# Patient Record
Sex: FEMALE | URBAN - METROPOLITAN AREA
[De-identification: names, ages, dates, MRNs, and addresses within clinical notes are randomized per-mention and may not be internally consistent; named-entity substitution may affect disease eponyms.]

---

## 2017-11-29 PROBLEM — M54.2 CERVICALGIA: Status: ACTIVE | Noted: 2017-11-29

## 2020-02-27 ENCOUNTER — APPOINTMENT (RX ONLY)
Dept: URBAN - METROPOLITAN AREA CLINIC 38 | Facility: CLINIC | Age: 75
Setting detail: DERMATOLOGY
End: 2020-02-27

## 2020-02-27 DIAGNOSIS — L81.4 OTHER MELANIN HYPERPIGMENTATION: ICD-10-CM

## 2020-02-27 DIAGNOSIS — L72.0 EPIDERMAL CYST: ICD-10-CM

## 2020-02-27 DIAGNOSIS — Z71.89 OTHER SPECIFIED COUNSELING: ICD-10-CM

## 2020-02-27 DIAGNOSIS — D22 MELANOCYTIC NEVI: ICD-10-CM

## 2020-02-27 DIAGNOSIS — L82.0 INFLAMED SEBORRHEIC KERATOSIS: ICD-10-CM

## 2020-02-27 DIAGNOSIS — L30.0 NUMMULAR DERMATITIS: ICD-10-CM

## 2020-02-27 DIAGNOSIS — L82.1 OTHER SEBORRHEIC KERATOSIS: ICD-10-CM

## 2020-02-27 DIAGNOSIS — D18.0 HEMANGIOMA: ICD-10-CM

## 2020-02-27 PROBLEM — D18.01 HEMANGIOMA OF SKIN AND SUBCUTANEOUS TISSUE: Status: ACTIVE | Noted: 2020-02-27

## 2020-02-27 PROBLEM — D22.9 MELANOCYTIC NEVI, UNSPECIFIED: Status: ACTIVE | Noted: 2020-02-27

## 2020-02-27 PROCEDURE — ? COUNSELING

## 2020-02-27 PROCEDURE — ? PRESCRIPTION

## 2020-02-27 PROCEDURE — ? LIQUID NITROGEN

## 2020-02-27 PROCEDURE — 99203 OFFICE O/P NEW LOW 30 MIN: CPT | Mod: 25

## 2020-02-27 PROCEDURE — 17110 DESTRUCTION B9 LES UP TO 14: CPT | Mod: 52

## 2020-02-27 RX ORDER — TRIAMCINOLONE ACETONIDE 1 MG/G
CREAM TOPICAL BID
Qty: 1 | Refills: 3 | Status: CANCELLED

## 2020-02-27 ASSESSMENT — LOCATION SIMPLE DESCRIPTION DERM
LOCATION SIMPLE: LEFT LIP
LOCATION SIMPLE: RIGHT POSTERIOR THIGH
LOCATION SIMPLE: ABDOMEN

## 2020-02-27 ASSESSMENT — LOCATION ZONE DERM
LOCATION ZONE: LEG
LOCATION ZONE: LIP
LOCATION ZONE: TRUNK

## 2020-02-27 ASSESSMENT — LOCATION DETAILED DESCRIPTION DERM
LOCATION DETAILED: LEFT LOWER CUTANEOUS LIP
LOCATION DETAILED: RIGHT PROXIMAL LATERAL POSTERIOR THIGH
LOCATION DETAILED: PERIUMBILICAL SKIN
LOCATION DETAILED: RIGHT PROXIMAL POSTERIOR THIGH

## 2020-02-27 NOTE — PROCEDURE: LIQUID NITROGEN

## 2023-09-28 ENCOUNTER — OFFICE VISIT (OUTPATIENT)
Dept: PHYSICAL MEDICINE AND REHAB | Facility: MEDICAL CENTER | Age: 78
End: 2023-09-28
Payer: MEDICARE

## 2023-09-28 VITALS
BODY MASS INDEX: 37.45 KG/M2 | SYSTOLIC BLOOD PRESSURE: 128 MMHG | DIASTOLIC BLOOD PRESSURE: 72 MMHG | WEIGHT: 203.48 LBS | TEMPERATURE: 97.7 F | OXYGEN SATURATION: 96 % | HEIGHT: 62 IN | HEART RATE: 80 BPM

## 2023-09-28 DIAGNOSIS — M20.41 HAMMER TOE OF RIGHT FOOT: ICD-10-CM

## 2023-09-28 DIAGNOSIS — M19.072 OSTEOARTHRITIS OF JOINT OF TOE OF LEFT FOOT: ICD-10-CM

## 2023-09-28 DIAGNOSIS — R20.0 NUMBNESS AND TINGLING OF BOTH FEET: ICD-10-CM

## 2023-09-28 DIAGNOSIS — Z71.3 DIETARY COUNSELING: ICD-10-CM

## 2023-09-28 DIAGNOSIS — M51.36 DEGENERATIVE LUMBAR DISC: ICD-10-CM

## 2023-09-28 DIAGNOSIS — Z91.81 RISK FOR FALLS: ICD-10-CM

## 2023-09-28 DIAGNOSIS — Z71.82 EXERCISE COUNSELING: ICD-10-CM

## 2023-09-28 DIAGNOSIS — R73.03 PREDIABETES: ICD-10-CM

## 2023-09-28 DIAGNOSIS — M47.816 LUMBAR FACET ARTHROPATHY: ICD-10-CM

## 2023-09-28 DIAGNOSIS — M43.16 SPONDYLOLISTHESIS OF LUMBAR REGION: ICD-10-CM

## 2023-09-28 DIAGNOSIS — R20.2 NUMBNESS AND TINGLING OF BOTH FEET: ICD-10-CM

## 2023-09-28 DIAGNOSIS — M19.071 OSTEOARTHRITIS OF JOINT OF TOE OF RIGHT FOOT: ICD-10-CM

## 2023-09-28 DIAGNOSIS — E66.9 OBESITY (BMI 30-39.9): ICD-10-CM

## 2023-09-28 PROCEDURE — 3078F DIAST BP <80 MM HG: CPT | Performed by: GENERAL PRACTICE

## 2023-09-28 PROCEDURE — 99204 OFFICE O/P NEW MOD 45 MIN: CPT | Performed by: GENERAL PRACTICE

## 2023-09-28 PROCEDURE — 1125F AMNT PAIN NOTED PAIN PRSNT: CPT | Performed by: GENERAL PRACTICE

## 2023-09-28 PROCEDURE — 3074F SYST BP LT 130 MM HG: CPT | Performed by: GENERAL PRACTICE

## 2023-09-28 PROCEDURE — 1170F FXNL STATUS ASSESSED: CPT | Performed by: GENERAL PRACTICE

## 2023-09-28 RX ORDER — GABAPENTIN 100 MG/1
100-300 CAPSULE ORAL NIGHTLY
Qty: 90 CAPSULE | Refills: 3 | Status: SHIPPED | OUTPATIENT
Start: 2023-09-28 | End: 2024-01-26

## 2023-09-28 ASSESSMENT — PATIENT HEALTH QUESTIONNAIRE - PHQ9: CLINICAL INTERPRETATION OF PHQ2 SCORE: 0

## 2023-09-28 ASSESSMENT — PAIN SCALES - GENERAL: PAINLEVEL: 5=MODERATE PAIN

## 2023-09-28 ASSESSMENT — FIBROSIS 4 INDEX: FIB4 SCORE: 2.64

## 2023-09-28 NOTE — PROGRESS NOTES
Physiatry (Physical Medicine and  Rehabilitation)     Date of service: See epic    Chief complaint:   Chief Complaint   Patient presents with    Establish Care     Chronic Midline LBP w/out Sciatica        Referring provider: Kimberly Collins M.D.    HISTORY    HPI:  Marylou Candelario is a 78 y.o. year old pleasant female with past medical history of vitamin D deficiency, hepatic dysfunction, left knee meniscus 1996, disc decompression level 1 and 2 thermal disco plasty 2003, lower back stenosis and back fusion with bone graft 2004, left and right shoulder rotator pathology, who presents with a referral for Chronic midline low back pain without sciatica      Medical records review:  I reviewed the note from the referring provider Kimberly Collins M.D. including the note dated 6/15/2023.    Podiatry 9/12/2023 Branden Card: Patient is here for evaluation of right foot pain. She describes pain of the right medial forefoot area as well as the right 2nd toe. She is not diabetic but does relate significant numbness in her feet. She does have a history of back surgery and peripheral neuropathy.    Prior Procedures:   Spinal surgery with fusion  Stem cell injection  in [place] 2018 2003 disc impression level 1 and 2 thermal discal plasty  2000 for lower back stenosis and back fusion with bone graft      Patient presented for chronic low back pain.  History of disc decompression and back fusion in 2003 and 2004.  Described pain over right SI joint.  No radiation of pain.  Currently using ibuprofen 200 mg and Tylenol as needed.  Says she lives by herself she is avoiding any sedate of medications.  She stated that she has completed her year of physical therapy aquatic-based due to intolerance of land therapy.  She is eligible for physical therapy prescription in January.  In addition she does use a cane and walker for ambulation but prefers the cane.  Her pain is aggravated with walking, bending forward and backward, side  bending, walking up and down stairs, and laying down.  Though she said sometimes laying down can help her out.  In addition she has been seen by podiatry for burning tingling sensation of her feet.  Stated currently she is only being first tried a topical over-the-counter medication and the podiatrist is recommending a specific shoe that insurance is unwilling to pay for.    ROS:   Red Flags ROS:   Fever, Chills, Sweats: Denies  Involuntary Weight Loss: Denies  Bladder Incontinence: Denies  Bowel Incontinence: denies  Saddle Anesthesia: Denies    FUNCTIONAL history: Stated that she does not drive, issues performing ADLs,?cane and walker,?lives?alone in [facility]   OCCUPATIONAL history: Retired     GOALS OF TREATMENT: Pain relief. improve function.        PMHx:   Past Medical History:   Diagnosis Date    Anxiety     CATARACT     bilateral IOLI    Depression     Heart burn     Hypertension     controlled w/meds    IBD (inflammatory bowel disease)     Muscle disorder     Nerve damage     LT 2ND & 3RD TOE FROM BACK SURG.    Pain     back (stenosis) and neck pain, lt shoulder pain    Sleep apnea     mild--doesn't like CPAP    Snoring        PSHx:   Past Surgical History:   Procedure Laterality Date    SHOULDER ARTHROSCOPY W/ ROTATOR CUFF REPAIR Right 07/20/2017    Procedure: RT SHOULDER ARTHROSCOPY W/ ROTATOR CUFF REPAIR, RTHROTOMY, ARTH SUBACROMIAL DECOM, DIST CLAV EXCISION,  BICEPS TENOTOMY;  Surgeon: Juan M Gonsalves M.D.;  Location: [facility];  Service:     OTHER  07/05/2016    Oral     MAMMOPLASTY REDUCTION  12/12/2013    Performed by Anil Buchanan M.D. at [facility]    SHOULDER ARTHROSCOPY W/ ROTATOR CUFF REPAIR  05/08/2013    Performed by Juan M [facility]alves M.D[facility] at [facility]    HAMMERTOE CORRECTION  05/08/2013    Performed by Juan M [facility]onsalves M.D. at [facility]    OTHER  01/01/2013    bilateral cataract surgery    [facility]    ABDOMINAL  HYSTERECTOMY TOTAL      APPENDECTOMY      EYE SURGERY Bilateral     GYN SURGERY      hysto    LAMINOTOMY      LUMPECTOMY      OTHER      bilat ear cosmetic surg.    OTHER      hemorrhoidectomy    OTHER ABDOMINAL SURGERY      appy, brinda    OTHER ORTHOPEDIC SURGERY      RT CTR, LT KNEE SCOPE,MENISCUS REPAIR    OTHER ORTHOPEDIC SURGERY      SPINAL SURGERY x3 W/FUSION       Family history   Family History   Problem Relation Age of Onset    Cancer Mother     Cancer Father     Heart Disease Brother        Medications: reviewed on epic.   Outpatient Medications Marked as Taking for the 9/28/23 encounter (Office Visit) with Deangelo Marks D.O.   Medication Sig Dispense Refill    gabapentin (NEURONTIN) 100 MG Cap Take 1-3 Capsules by mouth every evening for 120 days. 90 Capsule 3    potassium chloride SA (KDUR) 20 MEQ Tab CR TAKE 1 TABLET BY MOUTH TWICE A  Tablet 3    losartan (COZAAR) 25 MG Tab TAKE 1 TABLET BY MOUTH EVERY DAY 90 Tablet 3    vitamin D (CHOLECALCIFEROL) 1000 UNIT Tab Take 2,000 Units by mouth every day.      Multiple Vitamins-Minerals (CENTRUM SILVER PO) Take  by mouth.      Omega-3 Fatty Acids (FISH OIL) 1000 MG Cap capsule Take 1,000 mg by mouth 3 times a day, with meals.      Glucosamine-Chondroitin (GLUCOSAMINE CHONDR COMPLEX PO) Take  by mouth.          Allergies:   Allergies   Allergen Reactions    Tape      Does not tolerate cloth --paper tape okay.       Social Hx:   Social History     Socioeconomic History    Marital status:      Spouse name: Not on file    Number of children: Not on file    Years of education: Not on file    Highest education level: Not on file   Occupational History    Not on file   Tobacco Use    Smoking status: Never    Smokeless tobacco: Never   Substance and Sexual Activity    Alcohol use: No    Drug use: No    Sexual activity: Not Currently   Other Topics Concern     Service No    Blood Transfusions No    Caffeine Concern No    Occupational Exposure No  "   Hobby Hazards No    Sleep Concern Yes    Stress Concern No    Weight Concern No    Special Diet No    Back Care No    Exercise No    Bike Helmet Yes    Seat Belt Yes    Self-Exams Yes   Social History Narrative    Not on file     Social Determinants of Health     Financial Resource Strain: Unknown (6/13/2023)    Overall Financial Resource Strain (CARDIA)     Difficulty of Paying Living Expenses: Patient refused   Food Insecurity: Unknown (6/13/2023)    Hunger Vital Sign     Worried About Running Out of Food in the Last Year: Patient refused     Ran Out of Food in the Last Year: Patient refused   Transportation Needs: Unknown (6/13/2023)    PRAPARE - Transportation     Lack of Transportation (Medical): Patient refused     Lack of Transportation (Non-Medical): Patient refused   Physical Activity: Unknown (6/13/2023)    Exercise Vital Sign     Days of Exercise per Week: Patient refused     Minutes of Exercise per Session: Patient refused   Stress: Unknown (6/13/2023)    [employer] - Occupational Stress Questionnaire     Feeling of Stress : Patient refused   Social Connections: Unknown (6/13/2023)    Social Connection and Isolation Panel [NHANES]     Frequency of Communication with Friends and Family: Patient refused     Frequency of Social Gatherings with Friends and Family: Patient refused     Attends Spiritism Services: Patient refused     Active Member of Clubs or Organizations: No     Attends Club or Organization Meetings: Patient refused     Marital Status:    Intimate Partner Violence: Not on file   Housing Stability: Not on file         EXAMINATION   Vitals: /72 (BP Location: Left arm, Patient Position: Sitting, BP Cuff Size: Large adult)   Pulse 80   Temp 36.5 °C (97.7 °F) (Temporal)   Ht 1.575 m (5' 2\")   Wt 92.3 kg (203 lb 7.8 oz)   SpO2 96%   Physical Exam:     Body Habitus: Body mass index is 37.22 kg/m².  Appearance: Well-groomed, well-nourished, not " disheveled  Eyes: No scleral icterus to suggest severe liver disease, no proptosis to suggest severe hyperthyroid  ENT -no obvious auditory deficits, no external lesions, moist mucus membranes   Skin -no rashes or lesions noted   Respiratory-  breathing comfortably on room air, no audible wheezing, full sentences  Cardiovascular- No lower extremity edema noted.   Psychiatric- alert and oriented,  calm, comfortable, cooperative     Musculoskeletal and Neuro:  Gait and station - normal gait with reciprocal pattern,  no use of ambulatory device, no arm assistance with sit-to-stand, nonantalgic. no loss of balance.  No change in patient's demeanor with exam. Able to toe/heel/tandem walk without difficulty, and No pes planus, no pes cavus.   Grossly normal cranial nerve exam and sensory exam grossly  Coordination grossly intact  Single leg balance / Trendelenburg:  severely limited in balance and control positive bilaterally    Thoracic/Lumbar Spine/Sacral Spine/Hips   Inspection: No evidence of atrophy in bilateral lower extremities throughout     ROM: full  active range of motion with flexion, lateral flexion, and rotation bilaterally.   There is decreased active range of motion with lumbar extension with pain.    There is pain with facet loading maneuver (extension rotation) with axial low back pain on the RIGHT side(s)    Palpation:   No tenderness to palpation in midline at T1-T12 levels. No tenderness to palpation in the left and right of the midline T1-L5  palpation over SI joint: positive right, negative left  palpation in hip or over the gluteus medius tendon insertion: positive bilaterally     Lumbar spine Special tests  Neuro tension  Straight leg test equivocal pain to tightness bilateral hamstrings  Slump test equivocal pain to tightness bilateral hamstrings    HIP  FAIR test positive right, negative left   Fátima equivocal pain to right greater trochanter    SI joint tests  Observation patient sits on one  "buttocks: Negative  SI joint compression equivocal pain to greater trochanters     Thigh thrust test equivocal pain to right greater trochanter      Key points for the international standards for neurological classification of spinal cord injury (ISNCSCI) to light touch.   Dermatome R L   L2 2 2   L3 2 2   L4 2 2   L5 2 2   S1 2 2     Motor Exam Lower Extremities  ? Myotome R L   Hip flexion L2 4+ pain 5   Knee extension L3 5 5   Ankle dorsiflexion L4 5 5   Toe extension L5 5 5   Ankle plantarflexion S1 5 5     Reflexes  Clonus of the ankle negative bilaterally     Bilateral feet: Pes planus, hammertoes, no obvious open lesions.      MEDICAL DECISION MAKING    Medical records review: see under HPI section.     DATA    Labs:   Lab Results   Component Value Date/Time    SODIUM 141 05/29/2023 09:24 AM    POTASSIUM 4.4 05/29/2023 09:24 AM    CHLORIDE 109 (H) 05/29/2023 09:24 AM    CO2 22 05/29/2023 09:24 AM    ANION 10 05/29/2023 09:24 AM    GLUCOSE 99 05/29/2023 09:24 AM    BUN 15 05/29/2023 09:24 AM    CREATININE 0.6 05/29/2023 09:24 AM    CALCIUM 9.5 05/29/2023 09:24 AM    ASTSGOT 49 (H) 05/29/2023 09:24 AM    ALTSGPT 46 05/29/2023 09:24 AM    TBILIRUBIN 0.7 05/29/2023 09:24 AM    ALBUMIN 4.1 05/29/2023 09:24 AM    TOTPROTEIN 7.1 05/29/2023 09:24 AM    AGRATIO 1.4 05/29/2023 09:24 AM   ]    No results found for: \"PROTHROMBTM\", \"INR\"     Lab Results   Component Value Date/Time    WBC 7.0 09/06/2022 02:18 PM    RBC 5.70 (H) 09/06/2022 02:18 PM    HEMOGLOBIN 17.9 (H) 09/06/2022 02:18 PM    HEMATOCRIT 52.5 (H) 09/06/2022 02:18 PM    MCV 92.1 09/06/2022 02:18 PM    MCH 31.4 09/06/2022 02:18 PM    MCHC 34.1 09/06/2022 02:18 PM    MPV 11.1 (H) 09/06/2022 02:18 PM    NEUTSPOLYS 83.6 (H) 12/13/2013 04:40 AM    LYMPHOCYTES 10.4 (L) 12/13/2013 04:40 AM    MONOCYTES 5.9 12/13/2013 04:40 AM    EOSINOPHILS 0.0 12/13/2013 04:40 AM    BASOPHILS 0.1 12/13/2013 04:40 AM        Lab Results   Component Value Date/Time    HBA1C 5.7 (H) " 05/29/2023 09:24 AM        Imaging:   I personally reviewed following images, these are my reads  June 2019 lumbar x-ray: Mild lumbar dextroscoliosis suspect acquired, facet arthropathy lower levels L1 down to S1.  Anterolisthesis L2 on L3 and L4 and L5.    Left foot x-ray 8/29/2022: First MTP osteoarthritis, second proximal phalange E deformity appears chronic.  Pes planus, calcifications of noted in plantar tendon.  Calcaneal spur.    Right foot x-ray 8/29/2022: Worst right first MTP osteoarthritis, narrowing of proximal phalanges specially second.  Pes planus.  Calcaneal spur    IMAGING radiology reads. I reviewed the following radiology reads                                                             Results for orders placed during the hospital encounter of 12/26/15    DX-CERVICAL SPINE-4+ VIEWS    Impression  Degenerative disc disease noted at C4-C6.    Otherwise unremarkable cervical spine    Reji Pearce MD  12/26/2015 2:06 PM           Results for orders placed in visit on 08/29/22      HISTORY/REASON FOR EXAM:  Atraumatic Pain/Swelling/Deformity.           TECHNIQUE/EXAM DESCRIPTION AND NUMBER OF VIEWS: LEFT foot, 3 views, 8/29/2022 1:00 PM.     COMPARISON: None.     FINDINGS:  There is severe osteoarthritis of the first metatarsal phalangeal joint characterized by joint space narrowing, periarticular sclerosis and marginal osteophyte formation.  There is associated mild hallux valgus deformity.     Similar, but less substantial degenerative changes are noted involving second metatarsal phalangeal joint.     No fracture or destructive osseous process is noted.     There are calcifications involving the plantar fascia, raising the question of sequela of previous injury, or inflammation.     IMPRESSION:     Degenerative changes are present, but no fracture is noted.     This exam was performed in an orthopedic clinic of the [facility]. This interpretation is performed in addition to the  interpretation by the ordering provider    DX-LUMBAR SPINE-2 OR 3 VIEWS    HISTORY/REASON FOR EXAM: .  status post surgery 2004; standing if possible at least 3 views        TECHNIQUE/EXAM DESCRIPTION AND NUMBER OF VIEWS:  Lumbar spine, 3 views, 3/1/2019 3:05 PM.     COMPARISON: 7/10/2018     FINDINGS:  Bones are osteopenic. No definite fracture seen.     Alignment: At L2-3 there is listhesis measuring 6 mm. At L4-5 there is listhesis measuring 5 mm. Overall these findings are unchanged.     Degenerative Disk Disease: There are mild degenerative disc changes present throughout.     There are postoperative changes of a prior posterior decompression there is posterior lateral bone graft in place.     There are surgical clips likely from prior cholecystectomy.     There are vascular calcifications.     IMPRESSION:     1.  Degenerative changes as above.  2.  Listhesis at L2-3 and L4-5 is unchanged.    DX-SHOULDER 2+ RIGHT    Impression  Mild acromioclavicular and glenohumeral degenerative changes.    Matt Zhou DO  12/5/2015 5:43 PM    Results for orders placed during the hospital encounter of 07/10/18    DX-THORACIC SPINE-2 VIEWS    Impression  No fracture seen.    Call report made at approximately 11:00 AM to ordering provider            Diagnosis   Visit Diagnoses     ICD-10-CM   1. Lumbar facet arthropathy  M47.816   2. Spondylolisthesis of lumbar region  M43.16   3. Degenerative lumbar disc  M51.36   4. Osteoarthritis of joint of toe of left foot  M19.072   5. Osteoarthritis of joint of toe of right foot  M19.071   6. Hammer toe of right foot  M20.41   7. Obesity (BMI 30-39.9)  E66.9   8. Prediabetes  R73.03   9. Dietary counseling  Z71.3   10. Exercise counseling  Z71.82   11. Risk for falls  Z91.81       ASSESSMENT AND PLAN:  Marylou Candelario 78 y.o. female  Patient with historical and clinical evidence consistent with chronic low back pain.  Prior imaging in 2019 revealed lumbar facet arthropathy,  spondylolisthesis, degenerative lumbar disc.  today, suspected that her pain is likely from her right lower lumbar facets as she had pain in the region, prior imaging with lumbar facet arthropathy, and facet loading.  However, considering right SI joint dysfunction with positive Darwin finger.  Considering lumbar radiculopathy and will need imaging as weak right hip flexion.  At this point, will need updated imaging and will prescribe gabapentin.  In addition, patient has numbness and tingling of both feet with known osteoarthritis of feet and pes planus and currently involved with podiatry.  Has follow-up.  Extensive discussion regarding treatment options, at this time recommending the following:    Orders Placed This Encounter    DX-LUMBAR SPINE-2 OR 3 VIEWS    Referral to Physical Therapy    gabapentin (NEURONTIN) 100 MG Cap         PLAN  I did have an extensive discussion regarding pathology and treatment options with the patient today including medications, injections, physical agents (eg. water, heat, cold, TENS), therapy-based programs (eg. massage, stretching/traction, exercise), alternative modalities, and lastly surgical intervention:  -Limitations:   activity limitations recommended at this point in time of no heavy lifting, always using a walker instead of a cane, no axial loading  -please wear indoor closed toe shoes  -work on single leg balance  -remove rugs and keep home well lit  -use stationary bike  -always ambulate with cane or walker  -Therapy (PT/OT/Aquatherapy): Aquatic therapy ordered to focus on strengthening and stretching  -Home exercise program: encouraged and demonstrated home exercises of regular strengthening and stretching and stationary bicycle  -Office Injections: not indicated at this time  -Medications/Modalities:   -Uptitrate the gabapentin as follows:  Day 1-7: 100mg at bedtime every night  Day 8-14 (100mg two tabs): 200mg at bedtime every night  Day 15 and afterwards (100mg 3  tabs): 200mg at bedtime every night   -Tylenol/acetaminophen 975mg (for example, 3 tabs of 325mg, or 2 tabs of 500mg) every 6-8 hours as needed.  Do not take more than 3000mg of Tylenol in 24 hours.   -Ibuprofen 200mg (1-3 tabs of 600mg-800mg) every 6-8 hours (take with food) as needed.  Do not take more than 2400 mg of ibuprofen in 24 hours. Take with milk or with food.  do not take with other NSAIDs such as naproxen, ibuprofen, Celebrex, meloxicam.  -You may also try ice packs or heating pads to help with pain for no more than 15 minutes at a time  -Brace/orthotic/assistive devices: Advised patient to use walker instead of cane  Diagnostic workup: Reviewed 2019 with concerning findings but will need updated imaging and if concerning pathology likely will need MRI  Interventional program: Depending on results but patient may benefit as she would like to avoid oral medications  Referrals: Aqua therapy       Follow-up:  Return to clinic in 12 weeks for follow-up of symptomatology, or sooner as needed for any acute issues.  Patient expressed understanding of the management plan. Patient (and Family Members) was/were encouraged to call if any worries, issues, problems or concerns prior to the next visit     Please note that this dictation was created using voice recognition software. I have made every reasonable attempt to correct obvious errors but there may be errors of grammar and content that I may have overlooked prior to finalization of this note.      Deangelo Marks,   Physical Medicine and Rehabilitation  [facility]         MATIAS Collins M.D.   Kimberly Fletcher M.D.

## 2023-09-28 NOTE — PATIENT INSTRUCTIONS
"Uptitrate the gabapentin as follows:  Day 1-7: 100mg at bedtime every night  Day 8-14 (100mg two tabs): 200mg at bedtime every night  Day 15 and afterwards (100mg 3 tabs): 200mg at bedtime every night    Do not increase gabapentin any faster to minimize potential side effects. A possible side effect is drowsiness while your body is adjusting to the medicine. If you experience any unwanted side effects, decrease the gabapentin to the previous dose that you did not have side effects on. Gabapentin is a relatively slow acting medicine so you may not feel immediate relief.    Pain control:   -Tylenol/acetaminophen 975mg (for example, 3 tabs of 325mg, or 2 tabs of 500mg) every 6-8 hours as needed.  Do not take more than 3000mg of Tylenol in 24 hours.   -Ibuprofen 200mg (1-3 tabs of 600mg-800mg) every 6-8 hours (take with food) as needed.  Do not take more than 2400 mg of ibuprofen in 24 hours. Take with milk or with food.  do not take with other NSAIDs such as naproxen, ibuprofen, Celebrex, meloxicam.  -You may also try ice packs or heating pads to help with pain for no more than 15 minutes at a time    FALL PREVENTION  -please wear indoor closed toe shoes  -work on single leg balance  -remove rugs and keep home well lit  -use stationary bike  -always ambulate with cane or walker      Diet  \"-Emphasizes fruits, vegetables, whole grains, and fat-free or low-fat milk and milk products  -Includes a variety of protein foods such as seafood, lean meats and poultry, eggs, legumes (beans and peas), soy products, nuts, and seeds.  -Is low in added sugars, sodium, saturated fats, trans fats, and cholesterol.  -Stays within your daily calorie needs\"  https://www.cdc.gov/healthyweight/healthy_eating/index.html    Exercise  \"We know 150 minutes of physical activity each week sounds like a lot, but you don’t have to do it all at once. It could be 30 minutes a day, 5 days a week. You can spread your activity out during the week and " "break it up into smaller chunks of time.\"  https://www.cdc.gov/physicalactivity/basics/adults/index.htm    "

## 2024-07-09 ENCOUNTER — APPOINTMENT (RX ONLY)
Dept: URBAN - METROPOLITAN AREA CLINIC 38 | Facility: CLINIC | Age: 79
Setting detail: DERMATOLOGY
End: 2024-07-09

## 2024-07-09 DIAGNOSIS — D22 MELANOCYTIC NEVI: ICD-10-CM

## 2024-07-09 DIAGNOSIS — L82.0 INFLAMED SEBORRHEIC KERATOSIS: ICD-10-CM

## 2024-07-09 DIAGNOSIS — Z71.89 OTHER SPECIFIED COUNSELING: ICD-10-CM

## 2024-07-09 DIAGNOSIS — D18.0 HEMANGIOMA: ICD-10-CM

## 2024-07-09 DIAGNOSIS — L82.1 OTHER SEBORRHEIC KERATOSIS: ICD-10-CM

## 2024-07-09 DIAGNOSIS — L29.8 OTHER PRURITUS: ICD-10-CM

## 2024-07-09 DIAGNOSIS — L29.89 OTHER PRURITUS: ICD-10-CM

## 2024-07-09 DIAGNOSIS — L81.4 OTHER MELANIN HYPERPIGMENTATION: ICD-10-CM

## 2024-07-09 PROBLEM — D18.01 HEMANGIOMA OF SKIN AND SUBCUTANEOUS TISSUE: Status: ACTIVE | Noted: 2024-07-09

## 2024-07-09 PROBLEM — D22.9 MELANOCYTIC NEVI, UNSPECIFIED: Status: ACTIVE | Noted: 2024-07-09

## 2024-07-09 PROCEDURE — 99203 OFFICE O/P NEW LOW 30 MIN: CPT | Mod: 25

## 2024-07-09 PROCEDURE — 17110 DESTRUCTION B9 LES UP TO 14: CPT | Mod: 52

## 2024-07-09 PROCEDURE — ? LIQUID NITROGEN

## 2024-07-09 PROCEDURE — ? PRESCRIPTION

## 2024-07-09 PROCEDURE — ? COUNSELING

## 2024-07-09 RX ORDER — FLUOCINOLONE ACETONIDE 0.11 MG/ML
OIL TOPICAL
Qty: 118.28 | Refills: 2 | Status: ERX | COMMUNITY
Start: 2024-07-09

## 2024-07-09 RX ADMIN — FLUOCINOLONE ACETONIDE: 0.11 OIL TOPICAL at 00:00

## 2024-07-09 ASSESSMENT — LOCATION DETAILED DESCRIPTION DERM
LOCATION DETAILED: LEFT INFERIOR FOREHEAD
LOCATION DETAILED: INFERIOR MID FOREHEAD
LOCATION DETAILED: RIGHT PROXIMAL DORSAL FOREARM
LOCATION DETAILED: RIGHT LATERAL SUPERIOR CHEST
LOCATION DETAILED: MID-FRONTAL SCALP
LOCATION DETAILED: RIGHT MEDIAL TRAPEZIAL NECK
LOCATION DETAILED: PERIUMBILICAL SKIN
LOCATION DETAILED: LEFT PROXIMAL POSTERIOR UPPER ARM
LOCATION DETAILED: LEFT CLAVICULAR NECK
LOCATION DETAILED: RIGHT INFERIOR UPPER BACK
LOCATION DETAILED: LEFT DISTAL DORSAL FOREARM

## 2024-07-09 ASSESSMENT — LOCATION SIMPLE DESCRIPTION DERM
LOCATION SIMPLE: INFERIOR FOREHEAD
LOCATION SIMPLE: LEFT FOREARM
LOCATION SIMPLE: CHEST
LOCATION SIMPLE: ANTERIOR SCALP
LOCATION SIMPLE: LEFT FOREHEAD
LOCATION SIMPLE: RIGHT UPPER BACK
LOCATION SIMPLE: ABDOMEN
LOCATION SIMPLE: RIGHT FOREARM
LOCATION SIMPLE: LEFT ANTERIOR NECK
LOCATION SIMPLE: LEFT POSTERIOR UPPER ARM
LOCATION SIMPLE: POSTERIOR NECK

## 2024-07-09 ASSESSMENT — LOCATION ZONE DERM
LOCATION ZONE: FACE
LOCATION ZONE: SCALP
LOCATION ZONE: NECK
LOCATION ZONE: ARM
LOCATION ZONE: TRUNK

## 2024-07-09 NOTE — PROCEDURE: LIQUID NITROGEN
Spray Paint Technique: No
Medical Necessity Information: It is in your best interest to select a reason for this procedure from the list below. All of these items fulfill various CMS LCD requirements except the new and changing color options.
Render Post-Care Instructions In Note?: yes
Number Of Freeze-Thaw Cycles: 2 freeze-thaw cycles
Detail Level: Detailed
Post-Care Instructions: I reviewed with the patient in detail post-care instructions. Patient is to wear sunprotection, and avoid picking at any of the treated lesions. Pt may apply Vaseline to crusted or scabbing areas.
Medical Necessity Clause: This procedure was medically necessary because the lesions that were treated were:
Spray Paint Text: The liquid nitrogen was applied to the skin utilizing a spray paint frosting technique.
Consent: The patient's consent was obtained including but not limited to risks of crusting, scabbing, blistering, scarring, darker or lighter pigmentary change, recurrence, incomplete removal and infection.